# Patient Record
Sex: FEMALE | Race: WHITE | Employment: UNEMPLOYED | URBAN - METROPOLITAN AREA
[De-identification: names, ages, dates, MRNs, and addresses within clinical notes are randomized per-mention and may not be internally consistent; named-entity substitution may affect disease eponyms.]

---

## 2017-01-30 ENCOUNTER — APPOINTMENT (OUTPATIENT)
Dept: LAB | Age: 52
End: 2017-01-30
Attending: INTERNAL MEDICINE
Payer: COMMERCIAL

## 2017-01-30 DIAGNOSIS — C73 FOLLICULAR THYROID CANCER (HCC): ICD-10-CM

## 2017-01-30 DIAGNOSIS — E89.0 POSTPROCEDURAL HYPOTHYROIDISM: ICD-10-CM

## 2017-01-30 DIAGNOSIS — Z00.00 ROUTINE GENERAL MEDICAL EXAMINATION AT A HEALTH CARE FACILITY: ICD-10-CM

## 2017-01-30 DIAGNOSIS — Z79.899 LONG-TERM USE OF HIGH-RISK MEDICATION: ICD-10-CM

## 2017-01-30 LAB
BUN BLD-MCNC: 17 MG/DL (ref 8–20)
CALCIUM BLD-MCNC: 8.8 MG/DL (ref 8.3–10.3)
CHLORIDE: 105 MMOL/L (ref 101–111)
CO2: 27 MMOL/L (ref 22–32)
CREAT BLD-MCNC: 0.87 MG/DL (ref 0.55–1.02)
ERYTHROCYTE [DISTWIDTH] IN BLOOD BY AUTOMATED COUNT: 11.7 % (ref 11.5–16)
FREE T4: 1.5 NG/DL (ref 0.9–1.8)
GLUCOSE BLD-MCNC: 110 MG/DL (ref 70–99)
HCT VFR BLD AUTO: 35.7 % (ref 34–50)
HGB BLD-MCNC: 12.2 G/DL (ref 12–16)
MCH RBC QN AUTO: 31.4 PG (ref 27–33.2)
MCHC RBC AUTO-ENTMCNC: 34.2 G/DL (ref 31–37)
MCV RBC AUTO: 92 FL (ref 81–100)
PLATELET # BLD AUTO: 313 10(3)UL (ref 150–450)
POTASSIUM SERPL-SCNC: 4.4 MMOL/L (ref 3.6–5.1)
RBC # BLD AUTO: 3.88 X10(6)UL (ref 3.8–5.1)
RED CELL DISTRIBUTION WIDTH-SD: 39.4 FL (ref 35.1–46.3)
SODIUM SERPL-SCNC: 139 MMOL/L (ref 136–144)
TSI SER-ACNC: 0.08 MIU/ML (ref 0.35–5.5)
WBC # BLD AUTO: 5.3 X10(3) UL (ref 4–13)

## 2017-01-30 PROCEDURE — 80048 BASIC METABOLIC PNL TOTAL CA: CPT

## 2017-01-30 PROCEDURE — 80053 COMPREHEN METABOLIC PANEL: CPT

## 2017-01-30 PROCEDURE — 36415 COLL VENOUS BLD VENIPUNCTURE: CPT

## 2017-01-30 PROCEDURE — 84443 ASSAY THYROID STIM HORMONE: CPT

## 2017-01-30 PROCEDURE — 85027 COMPLETE CBC AUTOMATED: CPT

## 2017-01-30 PROCEDURE — 82306 VITAMIN D 25 HYDROXY: CPT

## 2017-01-30 PROCEDURE — 84439 ASSAY OF FREE THYROXINE: CPT

## 2017-01-30 PROCEDURE — 83036 HEMOGLOBIN GLYCOSYLATED A1C: CPT

## 2017-01-30 NOTE — PROGRESS NOTES
Quick Note:    Patient informed of Dr. Alphonse Hennessy result note. Patient verbalized understanding and agrees with plan.      rx sent.    ______

## 2017-01-30 NOTE — PROGRESS NOTES
Quick Note:    Dr. Aponte Son sent this result note to the patient as a [employer] message.   ______

## 2017-01-30 NOTE — PROGRESS NOTES
Quick Note:    [phone] (home)  [facility] regarding Dr. Aj Ponce result note.  Hours and number given.     ______

## 2017-01-31 LAB
EST. AVERAGE GLUCOSE BLD GHB EST-MCNC: 123 MG/DL (ref 68–126)
HBA1C MFR BLD HPLC: 5.9 % (ref ?–5.7)

## 2017-02-01 ENCOUNTER — NURSE ONLY (OUTPATIENT)
Dept: INTERNAL MEDICINE CLINIC | Facility: CLINIC | Age: 52
End: 2017-02-01

## 2017-02-01 DIAGNOSIS — E89.0 POSTPROCEDURAL HYPOTHYROIDISM: ICD-10-CM

## 2017-02-01 DIAGNOSIS — Z00.00 ROUTINE GENERAL MEDICAL EXAMINATION AT A HEALTH CARE FACILITY: Primary | ICD-10-CM

## 2017-02-01 LAB
25-HYDROXYVITAMIN D (TOTAL): 21 NG/ML (ref 30–100)
ALBUMIN SERPL-MCNC: 3.8 G/DL (ref 3.5–4.8)
ALP LIVER SERPL-CCNC: 70 U/L (ref 41–108)
ALT SERPL-CCNC: 23 U/L (ref 14–54)
AST SERPL-CCNC: 20 U/L (ref 15–41)
BILIRUB SERPL-MCNC: 0.3 MG/DL (ref 0.1–2)
BUN BLD-MCNC: 16 MG/DL (ref 8–20)
CALCIUM BLD-MCNC: 8.8 MG/DL (ref 8.3–10.3)
CHLORIDE: 107 MMOL/L (ref 101–111)
CO2: 24 MMOL/L (ref 22–32)
CREAT BLD-MCNC: 0.84 MG/DL (ref 0.55–1.02)
GLUCOSE BLD-MCNC: 104 MG/DL (ref 70–99)
M PROTEIN MFR SERPL ELPH: 7.5 G/DL (ref 6.1–8.3)
POTASSIUM SERPL-SCNC: 4.6 MMOL/L (ref 3.6–5.1)
SODIUM SERPL-SCNC: 140 MMOL/L (ref 136–144)

## 2017-02-01 PROCEDURE — 93000 ELECTROCARDIOGRAM COMPLETE: CPT | Performed by: INTERNAL MEDICINE

## 2017-02-01 PROCEDURE — 99173 VISUAL ACUITY SCREEN: CPT | Performed by: INTERNAL MEDICINE

## 2017-02-01 PROCEDURE — 92551 PURE TONE HEARING TEST AIR: CPT | Performed by: INTERNAL MEDICINE

## 2017-02-01 PROCEDURE — 83704 LIPOPROTEIN BLD QUAN PART: CPT | Performed by: INTERNAL MEDICINE

## 2017-02-03 LAB
HDL CHOLESTEROL: 60 MG/DL
HDL PARTICLE NUMBER: 38.4 UMOL/L
HDL SIZE: 9.4 NM
LARGE HDL PARTICLE NUMBER: 5.8 UMOL/L
LARGE VLDL PARTICLE NUMBER: 3.3 NMOL/L
LDL CHOLESTEROL: 160 MG/DL
LDL PARTICLE NUMBER BY NMR: 1505 NMOL/L
LDL PARTICLE SIZE: 22.3 NM
LDL SIZE: 22.3 NM
LP INSULIN RESISTANCE SCORE: 43
SMALL LDL PARTICLE NUMBER: 155 NMOL/L
SMALL LDL-P: 155 NMOL/L
TOTAL CHOLESTEROL: 235 MG/DL
TRIGLYCERIDES: 74 MG/DL
VLDL SIZE: 52.8 NM

## 2017-02-09 ENCOUNTER — TELEPHONE (OUTPATIENT)
Dept: INTERNAL MEDICINE CLINIC | Facility: CLINIC | Age: 52
End: 2017-02-09

## 2017-02-09 ENCOUNTER — OFFICE VISIT (OUTPATIENT)
Dept: INTERNAL MEDICINE CLINIC | Facility: CLINIC | Age: 52
End: 2017-02-09

## 2017-02-09 VITALS
DIASTOLIC BLOOD PRESSURE: 72 MMHG | HEIGHT: 68 IN | RESPIRATION RATE: 16 BRPM | BODY MASS INDEX: 30.77 KG/M2 | SYSTOLIC BLOOD PRESSURE: 110 MMHG | HEART RATE: 76 BPM | TEMPERATURE: 98 F | WEIGHT: 203 LBS

## 2017-02-09 DIAGNOSIS — Z00.01 ENCOUNTER FOR GENERAL ADULT MEDICAL EXAMINATION WITH ABNORMAL FINDINGS: Primary | ICD-10-CM

## 2017-02-09 DIAGNOSIS — R06.02 SHORTNESS OF BREATH: Primary | ICD-10-CM

## 2017-02-09 DIAGNOSIS — E78.00 HYPERCHOLESTEREMIA: ICD-10-CM

## 2017-02-09 DIAGNOSIS — E89.0 POSTPROCEDURAL HYPOTHYROIDISM: ICD-10-CM

## 2017-02-09 DIAGNOSIS — E66.3 OVERWEIGHT: ICD-10-CM

## 2017-02-09 DIAGNOSIS — R94.31 ABNORMAL EKG: ICD-10-CM

## 2017-02-09 DIAGNOSIS — Z83.79 FAMILY HISTORY OF GALLSTONES: ICD-10-CM

## 2017-02-09 DIAGNOSIS — Z85.850 HISTORY OF THYROID CANCER: ICD-10-CM

## 2017-02-09 DIAGNOSIS — Z12.31 ENCOUNTER FOR MAMMOGRAM TO ESTABLISH BASELINE MAMMOGRAM: ICD-10-CM

## 2017-02-09 DIAGNOSIS — R10.10 UPPER ABDOMINAL PAIN: ICD-10-CM

## 2017-02-09 PROCEDURE — 99396 PREV VISIT EST AGE 40-64: CPT | Performed by: INTERNAL MEDICINE

## 2017-02-09 RX ORDER — LORATADINE 10 MG/1
10 TABLET ORAL DAILY PRN
COMMUNITY

## 2017-02-09 RX ORDER — TRAMADOL HYDROCHLORIDE 50 MG/1
TABLET ORAL
Qty: 60 TABLET | Refills: 3 | Status: SHIPPED | OUTPATIENT
Start: 2017-02-09 | End: 2017-06-23

## 2017-02-09 RX ORDER — MULTIVIT-MIN/IRON/FOLIC ACID/K 18-600-40
2000 CAPSULE ORAL
COMMUNITY
End: 2017-04-14

## 2017-02-09 NOTE — PROGRESS NOTES
Isamar name: Balta Brownlee  /Sex/Age:  46year old female  Enc.  Date: 2017     Visit Information:  CC: Balta Brownlee is here for the [facility] Wellness Exam.  We are happy to be caring for you, Osmany Vernon,  and are ready to support your eating smaller portions, more vegetables. You are unable to get much exercise. We discussed and gave you hand-outs on a 1200-calorie diet, discussed Weight Watchers or Scottie Pineda.  In addition, JORGE LUIS has one month of recipes and menus for a 1200-calorie d between 150 mcg and 175 mcg. Disp: 90 tablet Rfl: 3   [DISCONTINUED] Levothyroxine Sodium 150 MCG Oral Tab Take 1 tablet (150 mcg total) by mouth before breakfast. Take 1 tablet by mouth every other day. Alternating between 150 mcg and 175 mcg.  Disp: 90 ta Date(s) Administered    >=3 YRS FLUZONE OR FLUARIX QUAD PRESERVE FREE SINGLE DOSE (38905) FLU CLINIC                          01/14/2016  10/11/2016      Flu Vacc 4 SRUTHI Split Virus 3 YR+                          10/22/2014    5.  Dental: every 6 months  6 Interpretation: 0 - 3 No Risk     PHQ-4: Over the LAST 2 WEEKS               Little interest or pleasure in doing things (over the last two weeks)?: Not at all    Feeling down, depressed, or hopeless (over the last two weeks)?: Not at all           Advance swelling. Joints without redness, swelling or tenderness. FROM. Decreased ROM of left hip. Skin: Clear, no rashes, lesions. .   Neuro: Alert, oriented, cranial nerves intact. No motor weakness, sensory loss.  Gait normal.  Reflexes normal.   Psych: No an High     75th    50th    25th   Low                         >34.9    34.9    30.5    26.7   <26.7                                                             .     Small LDL-P          Low      25th    50th    75th   High                         <117 MD  Performed at: [facility], [address], [address] [place] [address]   HDL Cholesterol Date: 02/01/2017   Value: 60  Ref Range >39 mg/dL Status: Final    Comment: [address], [address] [place], [place] [address] Percentile in Reference Population    Large VLDL-P      Low     25th     50th     75th     High                      <0.9    0.9      2.7      6.9      >6.9                                                             .     Small LDL-P       Low     25th [place]   [address]   Small LDL-P Date: 02/01/2017   Value: 155  Ref Range <=527 nmol/L Status: Final    Comment: [address]  [phone]  Idania Merchant MD  Performed at: [facility], [address] [place] [address] been shown to protect your heart and lungs, improve GI function and keep your joints limber. In addition, exercise along with maintaining sociability are the only habits shown to decrease your risk of Alzheimer's.     Access the newly-improved [facility]

## 2017-02-09 NOTE — TELEPHONE ENCOUNTER
Jean Claude Babcock is calling in requesting an order for her abdominal US. Jean Claude Babcock already scheduled her mammogram but now needs the order for her US to r/o gall stones.     Jean Claude Babcock will call Central Scheduling tomorrow to schedule her US unless the office calls back adin

## 2017-02-11 PROBLEM — Z85.850 HISTORY OF THYROID CANCER: Status: ACTIVE | Noted: 2017-02-11

## 2017-02-14 ENCOUNTER — APPOINTMENT (OUTPATIENT)
Dept: MAMMOGRAPHY | Facility: HOSPITAL | Age: 52
End: 2017-02-14
Attending: INTERNAL MEDICINE
Payer: COMMERCIAL

## 2017-02-14 ENCOUNTER — HOSPITAL ENCOUNTER (OUTPATIENT)
Dept: CV DIAGNOSTICS | Facility: HOSPITAL | Age: 52
Discharge: HOME | End: 2017-02-14
Attending: INTERNAL MEDICINE
Payer: COMMERCIAL

## 2017-02-14 DIAGNOSIS — E78.00 HYPERCHOLESTEREMIA: ICD-10-CM

## 2017-02-14 DIAGNOSIS — R94.31 ABNORMAL EKG: ICD-10-CM

## 2017-02-14 PROCEDURE — 78452 HT MUSCLE IMAGE SPECT MULT: CPT | Performed by: INTERNAL MEDICINE

## 2017-02-14 PROCEDURE — 93018 CV STRESS TEST I&R ONLY: CPT | Performed by: INTERNAL MEDICINE

## 2017-02-14 PROCEDURE — 78452 HT MUSCLE IMAGE SPECT MULT: CPT

## 2017-02-14 PROCEDURE — 93017 CV STRESS TEST TRACING ONLY: CPT

## 2017-02-16 ENCOUNTER — HOSPITAL ENCOUNTER (OUTPATIENT)
Dept: MAMMOGRAPHY | Facility: HOSPITAL | Age: 52
Discharge: HOME | End: 2017-02-16
Attending: INTERNAL MEDICINE
Payer: COMMERCIAL

## 2017-02-16 DIAGNOSIS — Z12.31 ENCOUNTER FOR MAMMOGRAM TO ESTABLISH BASELINE MAMMOGRAM: ICD-10-CM

## 2017-02-16 PROCEDURE — 77067 SCR MAMMO BI INCL CAD: CPT

## 2017-02-17 ENCOUNTER — HOSPITAL ENCOUNTER (OUTPATIENT)
Dept: ULTRASOUND IMAGING | Facility: HOSPITAL | Age: 52
Discharge: HOME | End: 2017-02-17
Attending: INTERNAL MEDICINE
Payer: COMMERCIAL

## 2017-02-17 DIAGNOSIS — R10.10 UPPER ABDOMINAL PAIN: ICD-10-CM

## 2017-02-17 DIAGNOSIS — Z83.79 FAMILY HISTORY OF GALLSTONES: ICD-10-CM

## 2017-02-17 PROCEDURE — 76700 US EXAM ABDOM COMPLETE: CPT

## 2017-02-24 ENCOUNTER — HOSPITAL ENCOUNTER (OUTPATIENT)
Dept: MAMMOGRAPHY | Facility: HOSPITAL | Age: 52
Discharge: HOME | End: 2017-02-24
Attending: INTERNAL MEDICINE
Payer: COMMERCIAL

## 2017-02-24 DIAGNOSIS — R92.2 INCONCLUSIVE MAMMOGRAM: ICD-10-CM

## 2017-02-24 PROCEDURE — 76642 ULTRASOUND BREAST LIMITED: CPT

## 2017-02-24 PROCEDURE — 77065 DX MAMMO INCL CAD UNI: CPT

## 2017-02-24 PROCEDURE — 77061 BREAST TOMOSYNTHESIS UNI: CPT

## 2017-03-10 ENCOUNTER — OFFICE VISIT (OUTPATIENT)
Dept: INTERNAL MEDICINE CLINIC | Facility: CLINIC | Age: 52
End: 2017-03-10

## 2017-03-10 VITALS
SYSTOLIC BLOOD PRESSURE: 118 MMHG | DIASTOLIC BLOOD PRESSURE: 70 MMHG | RESPIRATION RATE: 18 BRPM | OXYGEN SATURATION: 97 % | WEIGHT: 198.81 LBS | BODY MASS INDEX: 30 KG/M2 | TEMPERATURE: 98 F | HEART RATE: 72 BPM

## 2017-03-10 DIAGNOSIS — Z85.850 HISTORY OF THYROID CANCER: ICD-10-CM

## 2017-03-10 DIAGNOSIS — E66.3 OVERWEIGHT: ICD-10-CM

## 2017-03-10 DIAGNOSIS — M16.0 PRIMARY OSTEOARTHRITIS OF BOTH HIPS: ICD-10-CM

## 2017-03-10 DIAGNOSIS — Z51.89 ENCOUNTER FOR MEDICATION ADJUSTMENT: Primary | ICD-10-CM

## 2017-03-10 DIAGNOSIS — E55.9 VITAMIN D DEFICIENCY: ICD-10-CM

## 2017-03-10 DIAGNOSIS — R73.03 PREDIABETES: ICD-10-CM

## 2017-03-10 PROCEDURE — 99214 OFFICE O/P EST MOD 30 MIN: CPT | Performed by: INTERNAL MEDICINE

## 2017-03-10 NOTE — PATIENT INSTRUCTIONS
Look up [employer] to help you with your dieting    (M16.0) Primary osteoarthritis of both hips, uncontrolled  Plan:  Trylenol for moderate pain            Diclofenac for severe pain prn            F/U Dr. Alexandre Castillo prn    (E66.3) Overweight, uncontroll

## 2017-03-10 NOTE — PROGRESS NOTES
Patient presents with:  Medication Follow-Up: not taking tramadol made her tired       HPI:     Hip pain:  Still but Trama dol too sedating. Took Diclofenac due to severe pain. Taking Tylenol for arthritis to take edge off regularly and Diclof prn. [Other] [OTHER]       Mtr, sister, niece         Physical Exam: (2)  /70 mmHg  Pulse 72  Temp(Src) 97.8 °F (36.6 °C) (Oral)  Resp 18  Wt 198 lb 12.8 oz  SpO2 97%  LMP 05/29/2016 (Approximate)  Breastfeeding?  No  Alert, in no distress  Lungs: Clear, n

## 2017-03-11 NOTE — PROGRESS NOTES
Patient presents with:  Medication Follow-Up: not taking tramadol made her tired       HPI: Winifredkhushbu Marin is here for follow-up of hip pain, weight loss, review of mammogram and colonoscopy. Hip pain:  Still has but Tramadol was too sedating.   Prabhakar Barrett between 150 mcg and 175 mcg. Disp: 90 tablet Rfl: 3   Diclofenac Sodium 75 MG Oral Tab EC Take 1 tablet (75 mg total) by mouth 2 (two) times daily.  Disp: 180 tablet Rfl: 0   TraMADol HCl 50 MG Oral Tab One to two tabs po four times a day as needed for pain Diclofenac for severe pain prn            F/U Dr. Rafael Nelson prn    (E66.3) Overweight, uncontrolled but improving  Plan: Continue dieting about 1200 robbin per day and exercise           F/U 1 month    (R73.03) Prediabetes, uncontrolled  Plan: Continue weight

## 2017-03-28 ENCOUNTER — TELEPHONE (OUTPATIENT)
Dept: INTERNAL MEDICINE CLINIC | Facility: CLINIC | Age: 52
End: 2017-03-28

## 2017-04-12 ENCOUNTER — APPOINTMENT (OUTPATIENT)
Dept: LAB | Age: 52
End: 2017-04-12
Attending: INTERNAL MEDICINE
Payer: COMMERCIAL

## 2017-04-12 DIAGNOSIS — E55.9 VITAMIN D DEFICIENCY: ICD-10-CM

## 2017-04-12 DIAGNOSIS — R73.03 PREDIABETES: ICD-10-CM

## 2017-04-12 DIAGNOSIS — Z85.850 HISTORY OF THYROID CANCER: ICD-10-CM

## 2017-04-12 DIAGNOSIS — E78.00 HYPERCHOLESTEREMIA: ICD-10-CM

## 2017-04-12 PROCEDURE — 83036 HEMOGLOBIN GLYCOSYLATED A1C: CPT | Performed by: INTERNAL MEDICINE

## 2017-04-12 PROCEDURE — 36415 COLL VENOUS BLD VENIPUNCTURE: CPT | Performed by: INTERNAL MEDICINE

## 2017-04-12 PROCEDURE — 80061 LIPID PANEL: CPT | Performed by: INTERNAL MEDICINE

## 2017-04-12 PROCEDURE — 84443 ASSAY THYROID STIM HORMONE: CPT | Performed by: INTERNAL MEDICINE

## 2017-04-12 PROCEDURE — 84439 ASSAY OF FREE THYROXINE: CPT | Performed by: INTERNAL MEDICINE

## 2017-04-12 PROCEDURE — 82306 VITAMIN D 25 HYDROXY: CPT | Performed by: INTERNAL MEDICINE

## 2017-04-14 ENCOUNTER — OFFICE VISIT (OUTPATIENT)
Dept: INTERNAL MEDICINE CLINIC | Facility: CLINIC | Age: 52
End: 2017-04-14

## 2017-04-14 VITALS
RESPIRATION RATE: 16 BRPM | HEIGHT: 68 IN | HEART RATE: 72 BPM | BODY MASS INDEX: 28.95 KG/M2 | WEIGHT: 191 LBS | DIASTOLIC BLOOD PRESSURE: 74 MMHG | SYSTOLIC BLOOD PRESSURE: 104 MMHG | TEMPERATURE: 98 F

## 2017-04-14 DIAGNOSIS — Z71.3 ENCOUNTER FOR WEIGHT LOSS COUNSELING: Primary | ICD-10-CM

## 2017-04-14 DIAGNOSIS — E78.00 HYPERCHOLESTEREMIA: ICD-10-CM

## 2017-04-14 DIAGNOSIS — E66.3 OVERWEIGHT (BMI 25.0-29.9): ICD-10-CM

## 2017-04-14 DIAGNOSIS — C73 FOLLICULAR THYROID CANCER (HCC): ICD-10-CM

## 2017-04-14 DIAGNOSIS — R73.03 PREDIABETES: ICD-10-CM

## 2017-04-14 DIAGNOSIS — E89.0 POSTPROCEDURAL HYPOTHYROIDISM: ICD-10-CM

## 2017-04-14 PROCEDURE — 99213 OFFICE O/P EST LOW 20 MIN: CPT | Performed by: INTERNAL MEDICINE

## 2017-04-14 RX ORDER — MULTIVIT-MIN/IRON/FOLIC ACID/K 18-600-40
50000 CAPSULE ORAL EVERY OTHER DAY
Qty: 30 CAPSULE | Refills: 0 | COMMUNITY
Start: 2017-04-14

## 2017-04-14 RX ORDER — LEVOTHYROXINE SODIUM 0.15 MG/1
150 TABLET ORAL
Qty: 90 TABLET | Refills: 3 | Status: SHIPPED | OUTPATIENT
Start: 2017-04-14 | End: 2017-07-28

## 2017-04-14 NOTE — PATIENT INSTRUCTIONS
Keep up the good work working toward your goal for overall good health, heart disease, diabetes and cancer prevention.

## 2017-04-14 NOTE — PROGRESS NOTES
Patient presents with:  Weight Check: exercise 4x a week, decreasing calories       HPI: Osmany Vernon is here for weight check. Lab review. Osmany Vernon has systematically been following a 1200-robbin diet and exercising 30 min a day for the last month.   She has l TraMADol HCl 50 MG Oral Tab One to two tabs po four times a day as needed for pain.  Disp: 60 tablet Rfl: 3       PFHSH:   Family History   Problem Relation Age of Onset   • Lung cancer [Other] [OTHER] Father 67     Asbestos exposure   • Uterine Cancer Mo

## 2017-05-20 ENCOUNTER — HOSPITAL ENCOUNTER (EMERGENCY)
Facility: HOSPITAL | Age: 52
Discharge: HOME | End: 2017-05-20
Attending: EMERGENCY MEDICINE
Payer: COMMERCIAL

## 2017-05-20 ENCOUNTER — APPOINTMENT (OUTPATIENT)
Dept: GENERAL RADIOLOGY | Facility: HOSPITAL | Age: 52
End: 2017-05-20
Payer: COMMERCIAL

## 2017-05-20 VITALS
OXYGEN SATURATION: 100 % | HEIGHT: 68 IN | DIASTOLIC BLOOD PRESSURE: 96 MMHG | TEMPERATURE: 98 F | HEART RATE: 96 BPM | RESPIRATION RATE: 18 BRPM | SYSTOLIC BLOOD PRESSURE: 157 MMHG | BODY MASS INDEX: 28.34 KG/M2 | WEIGHT: 187 LBS

## 2017-05-20 DIAGNOSIS — S46.912A SHOULDER STRAIN, LEFT, INITIAL ENCOUNTER: Primary | ICD-10-CM

## 2017-05-20 PROCEDURE — 99283 EMERGENCY DEPT VISIT LOW MDM: CPT

## 2017-05-20 PROCEDURE — 73030 X-RAY EXAM OF SHOULDER: CPT

## 2017-05-20 RX ORDER — HYDROCODONE BITARTRATE AND ACETAMINOPHEN 5; 325 MG/1; MG/1
1 TABLET ORAL ONCE
Status: COMPLETED | OUTPATIENT
Start: 2017-05-20 | End: 2017-05-20

## 2017-05-20 RX ORDER — HYDROCODONE BITARTRATE AND ACETAMINOPHEN 5; 325 MG/1; MG/1
1-2 TABLET ORAL EVERY 4 HOURS PRN
Qty: 20 TABLET | Refills: 0 | Status: SHIPPED | OUTPATIENT
Start: 2017-05-20 | End: 2017-05-27

## 2017-05-20 NOTE — ED PROVIDER NOTES
Patient Seen in: [facility] Emergency Department    History   Patient presents with:  Upper Extremity Injury (musculoskeletal)    Stated Complaint: left shoulder injury- worse after chiropractor today    HPI    Patient is a 49-year-old female who st exposure   • Uterine Cancer Mother 48     Alive at 80   • Breast Cancer Sister 52     Double mastectomy; alive at 61   • Diabetes Father 67     Overweight   • Gallbladder [Other] [OTHER]       Mtr, sister, niece   • Appendix [Other] [OTHER]       Mtr, sist shoulder x-rayCONCLUSION:  A.c. joint hypertrophy with slight lateral downsloping of the acromion. No acute fracture or dislocation.     MDM   Patient was given Norco and has a friend driving. Patient does see Raul Carranza.   Patient will

## 2017-05-20 NOTE — ED NOTES
Pt states on Thursday she was getting up from a seated position using her hands to brace on the table (patient has a history of osteoarthritis in the hips and uses upper body to stand after sitting a while) when she felt a crunching in the left shoulder.

## 2017-06-23 ENCOUNTER — OFFICE VISIT (OUTPATIENT)
Dept: INTERNAL MEDICINE CLINIC | Facility: CLINIC | Age: 52
End: 2017-06-23

## 2017-06-23 VITALS
BODY MASS INDEX: 27.74 KG/M2 | OXYGEN SATURATION: 98 % | SYSTOLIC BLOOD PRESSURE: 114 MMHG | WEIGHT: 183 LBS | HEIGHT: 68 IN | RESPIRATION RATE: 17 BRPM | TEMPERATURE: 98 F | DIASTOLIC BLOOD PRESSURE: 68 MMHG | HEART RATE: 71 BPM

## 2017-06-23 DIAGNOSIS — Z85.850 HISTORY OF THYROID CANCER: ICD-10-CM

## 2017-06-23 DIAGNOSIS — E89.0 POSTPROCEDURAL HYPOTHYROIDISM: ICD-10-CM

## 2017-06-23 DIAGNOSIS — Z51.81 ENCOUNTER FOR MONITORING CHRONIC NSAID THERAPY: ICD-10-CM

## 2017-06-23 DIAGNOSIS — Z79.1 ENCOUNTER FOR MONITORING CHRONIC NSAID THERAPY: ICD-10-CM

## 2017-06-23 DIAGNOSIS — R73.02 GLUCOSE INTOLERANCE (IMPAIRED GLUCOSE TOLERANCE): ICD-10-CM

## 2017-06-23 DIAGNOSIS — M16.0 PRIMARY OSTEOARTHRITIS OF BOTH HIPS: Primary | ICD-10-CM

## 2017-06-23 PROCEDURE — 99214 OFFICE O/P EST MOD 30 MIN: CPT | Performed by: INTERNAL MEDICINE

## 2017-06-23 NOTE — PROGRESS NOTES
Julia French is a 46year old female.   Patient presents with:  Arthritis: Hip   Pre-diabetes  Thyroid Problem      HPI:     Patient here for establishing care, was with Dr. La Parker b/l hip pain due to advanced OA, needs replacement and thinking ab • Diabetes Father 67     Overweight   • Gallbladder [Other] [OTHER]       Mtr, sister, niece   • Appendix [Other] Mei Oseguera       Mtr, sister, niece        Allergies    Penicillins             Hives  Seasonal                Runny nose, Itching      REVIEW f/u.  There are no Patient Instructions on file for this visit. The patient indicates understanding of these issues and agrees to the plan.

## 2017-07-28 ENCOUNTER — APPOINTMENT (OUTPATIENT)
Dept: LAB | Age: 52
End: 2017-07-28
Attending: INTERNAL MEDICINE
Payer: COMMERCIAL

## 2017-07-28 DIAGNOSIS — Z51.81 ENCOUNTER FOR MONITORING CHRONIC NSAID THERAPY: ICD-10-CM

## 2017-07-28 DIAGNOSIS — Z79.1 ENCOUNTER FOR MONITORING CHRONIC NSAID THERAPY: ICD-10-CM

## 2017-07-28 DIAGNOSIS — R73.02 GLUCOSE INTOLERANCE (IMPAIRED GLUCOSE TOLERANCE): ICD-10-CM

## 2017-07-28 DIAGNOSIS — E89.0 POSTPROCEDURAL HYPOTHYROIDISM: ICD-10-CM

## 2017-07-28 LAB
ALBUMIN SERPL-MCNC: 3.5 G/DL (ref 3.5–4.8)
ALP LIVER SERPL-CCNC: 71 U/L (ref 41–108)
ALT SERPL-CCNC: 21 U/L (ref 14–54)
AST SERPL-CCNC: 11 U/L (ref 15–41)
BILIRUB SERPL-MCNC: 0.5 MG/DL (ref 0.1–2)
BUN BLD-MCNC: 13 MG/DL (ref 8–20)
CALCIUM BLD-MCNC: 8.3 MG/DL (ref 8.3–10.3)
CHLORIDE: 107 MMOL/L (ref 101–111)
CO2: 26 MMOL/L (ref 22–32)
CREAT BLD-MCNC: 0.83 MG/DL (ref 0.55–1.02)
EST. AVERAGE GLUCOSE BLD GHB EST-MCNC: 105 MG/DL (ref 68–126)
GLUCOSE BLD-MCNC: 89 MG/DL (ref 70–99)
HBA1C MFR BLD HPLC: 5.3 % (ref ?–5.7)
M PROTEIN MFR SERPL ELPH: 7.5 G/DL (ref 6.1–8.3)
POTASSIUM SERPL-SCNC: 4.1 MMOL/L (ref 3.6–5.1)
SODIUM SERPL-SCNC: 141 MMOL/L (ref 136–144)

## 2017-07-28 PROCEDURE — 83036 HEMOGLOBIN GLYCOSYLATED A1C: CPT

## 2017-07-28 PROCEDURE — 80053 COMPREHEN METABOLIC PANEL: CPT

## 2017-07-28 PROCEDURE — 36415 COLL VENOUS BLD VENIPUNCTURE: CPT

## 2017-08-30 ENCOUNTER — LAB ENCOUNTER (OUTPATIENT)
Dept: LAB | Age: 52
End: 2017-08-30
Attending: INTERNAL MEDICINE
Payer: COMMERCIAL

## 2017-08-30 DIAGNOSIS — E89.0 POSTPROCEDURAL HYPOTHYROIDISM: ICD-10-CM

## 2017-08-30 DIAGNOSIS — C73 THYROID CANCER (HCC): ICD-10-CM

## 2017-08-30 LAB
FREE T4: 1.4 NG/DL (ref 0.9–1.8)
TSI SER-ACNC: 0.06 MIU/ML (ref 0.35–5.5)

## 2017-08-30 PROCEDURE — 84443 ASSAY THYROID STIM HORMONE: CPT

## 2017-08-30 PROCEDURE — 84439 ASSAY OF FREE THYROXINE: CPT

## 2017-08-30 PROCEDURE — 36415 COLL VENOUS BLD VENIPUNCTURE: CPT

## 2017-11-17 ENCOUNTER — APPOINTMENT (OUTPATIENT)
Dept: LAB | Age: 52
End: 2017-11-17
Attending: INTERNAL MEDICINE
Payer: COMMERCIAL

## 2017-11-17 DIAGNOSIS — C73 THYROID CANCER (HCC): ICD-10-CM

## 2017-11-17 DIAGNOSIS — E89.0 POSTPROCEDURAL HYPOTHYROIDISM: ICD-10-CM

## 2017-11-17 PROCEDURE — 84439 ASSAY OF FREE THYROXINE: CPT

## 2017-11-17 PROCEDURE — 36415 COLL VENOUS BLD VENIPUNCTURE: CPT

## 2017-11-17 PROCEDURE — 84443 ASSAY THYROID STIM HORMONE: CPT

## 2017-11-27 ENCOUNTER — IMMUNIZATION (OUTPATIENT)
Dept: INTERNAL MEDICINE CLINIC | Facility: CLINIC | Age: 52
End: 2017-11-27

## 2017-11-27 DIAGNOSIS — Z23 NEED FOR VACCINATION: ICD-10-CM

## 2017-11-27 PROCEDURE — 90471 IMMUNIZATION ADMIN: CPT | Performed by: INTERNAL MEDICINE

## 2017-11-27 PROCEDURE — 90686 IIV4 VACC NO PRSV 0.5 ML IM: CPT | Performed by: INTERNAL MEDICINE

## 2017-12-19 ENCOUNTER — HOSPITAL ENCOUNTER (OUTPATIENT)
Dept: ULTRASOUND IMAGING | Facility: HOSPITAL | Age: 52
Discharge: HOME | End: 2017-12-19
Attending: INTERNAL MEDICINE
Payer: COMMERCIAL

## 2017-12-19 DIAGNOSIS — C73 THYROID CANCER (HCC): ICD-10-CM

## 2017-12-19 DIAGNOSIS — E89.0 POSTPROCEDURAL HYPOTHYROIDISM: ICD-10-CM

## 2017-12-19 PROCEDURE — 76536 US EXAM OF HEAD AND NECK: CPT | Performed by: INTERNAL MEDICINE

## 2018-01-08 ENCOUNTER — TELEPHONE (OUTPATIENT)
Dept: INTERNAL MEDICINE CLINIC | Facility: CLINIC | Age: 53
End: 2018-01-08

## 2018-01-08 DIAGNOSIS — E78.00 HYPERCHOLESTEREMIA: ICD-10-CM

## 2018-01-08 DIAGNOSIS — Z13.0 SCREENING, ANEMIA, DEFICIENCY, IRON: ICD-10-CM

## 2018-01-08 DIAGNOSIS — Z12.31 ENCOUNTER FOR SCREENING MAMMOGRAM FOR BREAST CANCER: Primary | ICD-10-CM

## 2018-01-08 DIAGNOSIS — Z13.228 SCREENING FOR METABOLIC DISORDER: ICD-10-CM

## 2018-01-11 PROBLEM — R73.03 PREDIABETES: Status: RESOLVED | Noted: 2017-03-10 | Resolved: 2018-01-11

## 2018-01-12 ENCOUNTER — LAB ENCOUNTER (OUTPATIENT)
Dept: LAB | Age: 53
End: 2018-01-12
Attending: INTERNAL MEDICINE
Payer: COMMERCIAL

## 2018-01-12 DIAGNOSIS — C73 THYROID CA (HCC): ICD-10-CM

## 2018-01-12 PROCEDURE — 36415 COLL VENOUS BLD VENIPUNCTURE: CPT

## 2018-01-12 PROCEDURE — 84432 ASSAY OF THYROGLOBULIN: CPT

## 2018-01-12 PROCEDURE — 86800 THYROGLOBULIN ANTIBODY: CPT

## 2018-01-14 LAB
THYROGLOBULIN AB: <0.9 IU/ML
THYROGLOBULIN, SERUM OR PLASMA: <0.1 NG/ML

## 2018-01-19 ENCOUNTER — PATIENT MESSAGE (OUTPATIENT)
Dept: INTERNAL MEDICINE CLINIC | Facility: CLINIC | Age: 53
End: 2018-01-19

## 2018-01-19 NOTE — TELEPHONE ENCOUNTER
From: Jeff Burkett  To: Donna Sandy MD  Sent: 1/19/2018 11:21 AM CST  Subject: Prescription Question    I need a refill on my prescription for diclofenac sodium.  Dr. Margie Hoyt refilled my last presecription because I was in between PCPs as Dr. Oli Mills

## 2018-01-22 RX ORDER — DICLOFENAC SODIUM 75 MG/1
75 TABLET, DELAYED RELEASE ORAL 2 TIMES DAILY
Qty: 60 TABLET | Refills: 0 | Status: SHIPPED | OUTPATIENT
Start: 2018-01-22 | End: 2018-02-26

## 2018-01-22 NOTE — TELEPHONE ENCOUNTER
LFV: 6/23/17 with TB  Future Appt: 2/14/18  Last Rx: 5/25/17 for 60 tablets w/2 refills from Dr. Lisa Riley  Last Labs:7/28/17 cmp stable  Per protocol to provider

## 2018-02-27 RX ORDER — DICLOFENAC SODIUM 75 MG/1
75 TABLET, DELAYED RELEASE ORAL 2 TIMES DAILY
Qty: 60 TABLET | Refills: 1 | Status: SHIPPED | OUTPATIENT
Start: 2018-02-27 | End: 2018-06-02

## 2018-02-27 NOTE — TELEPHONE ENCOUNTER
Last Office Visit: 6-23-17 with TB for arthritis  Last Rx Filled: 1-22-18 60 tabs with no refills   Last Labs: 7-28-17 hga1c/cmp  Future Appointment: none     Per protocol to provider

## 2018-04-26 ENCOUNTER — LAB ENCOUNTER (OUTPATIENT)
Dept: LAB | Age: 53
End: 2018-04-26
Attending: INTERNAL MEDICINE
Payer: COMMERCIAL

## 2018-04-26 DIAGNOSIS — Z13.228 SCREENING FOR METABOLIC DISORDER: ICD-10-CM

## 2018-04-26 DIAGNOSIS — E78.00 HYPERCHOLESTEREMIA: ICD-10-CM

## 2018-04-26 DIAGNOSIS — Z13.0 SCREENING, ANEMIA, DEFICIENCY, IRON: ICD-10-CM

## 2018-04-26 PROCEDURE — 80053 COMPREHEN METABOLIC PANEL: CPT | Performed by: INTERNAL MEDICINE

## 2018-04-26 PROCEDURE — 36415 COLL VENOUS BLD VENIPUNCTURE: CPT | Performed by: INTERNAL MEDICINE

## 2018-04-26 PROCEDURE — 85025 COMPLETE CBC W/AUTO DIFF WBC: CPT | Performed by: INTERNAL MEDICINE

## 2018-04-26 PROCEDURE — 80061 LIPID PANEL: CPT | Performed by: INTERNAL MEDICINE

## 2018-04-27 ENCOUNTER — HOSPITAL ENCOUNTER (OUTPATIENT)
Dept: MAMMOGRAPHY | Facility: HOSPITAL | Age: 53
Discharge: HOME | End: 2018-04-27
Attending: INTERNAL MEDICINE
Payer: COMMERCIAL

## 2018-04-27 DIAGNOSIS — Z12.31 ENCOUNTER FOR SCREENING MAMMOGRAM FOR BREAST CANCER: ICD-10-CM

## 2018-04-27 PROCEDURE — 77063 BREAST TOMOSYNTHESIS BI: CPT | Performed by: INTERNAL MEDICINE

## 2018-04-27 PROCEDURE — 77067 SCR MAMMO BI INCL CAD: CPT | Performed by: INTERNAL MEDICINE

## 2018-05-01 ENCOUNTER — OFFICE VISIT (OUTPATIENT)
Dept: INTERNAL MEDICINE CLINIC | Facility: CLINIC | Age: 53
End: 2018-05-01

## 2018-05-01 VITALS
TEMPERATURE: 98 F | DIASTOLIC BLOOD PRESSURE: 78 MMHG | WEIGHT: 173 LBS | HEIGHT: 68 IN | HEART RATE: 76 BPM | RESPIRATION RATE: 16 BRPM | SYSTOLIC BLOOD PRESSURE: 120 MMHG | BODY MASS INDEX: 26.22 KG/M2

## 2018-05-01 DIAGNOSIS — Z23 NEED FOR TDAP VACCINATION: ICD-10-CM

## 2018-05-01 DIAGNOSIS — Z00.00 ROUTINE GENERAL MEDICAL EXAMINATION AT A HEALTH CARE FACILITY: Primary | ICD-10-CM

## 2018-05-01 DIAGNOSIS — R92.2 DENSE BREASTS: ICD-10-CM

## 2018-05-01 DIAGNOSIS — J32.8 OTHER CHRONIC SINUSITIS: ICD-10-CM

## 2018-05-01 DIAGNOSIS — Z12.31 ENCOUNTER FOR SCREENING MAMMOGRAM FOR MALIGNANT NEOPLASM OF BREAST: ICD-10-CM

## 2018-05-01 PROCEDURE — 99396 PREV VISIT EST AGE 40-64: CPT | Performed by: INTERNAL MEDICINE

## 2018-05-01 PROCEDURE — 90471 IMMUNIZATION ADMIN: CPT | Performed by: INTERNAL MEDICINE

## 2018-05-01 PROCEDURE — 90715 TDAP VACCINE 7 YRS/> IM: CPT | Performed by: INTERNAL MEDICINE

## 2018-05-01 NOTE — PROGRESS NOTES
Patient presents with:  Physical: LB-room 3      HPI:    Patient here for cpe   utd on pap and mammogram.   C/o b/l hip pain due to advanced OA,  Takes diclofenac prn,seeing ortho  Hypothyroidism post surgery for thyroid cancer, follows with Dr. Fco Preston, feel exposure   • Uterine Cancer Mother 48     Alive at 80   • Breast Cancer Sister 52     Double mastectomy; alive at 61   • Gallbladder [OTHER] Other      Mtr, sister, niece   • Appendix Dorsie Angelinat Other      Mtr, sister, niece     Smoking status: Never Smoker and tympanic membranes normal.  Nose normal. Oropharynx is clear and moist.   Eyes: Conjunctivae and EOM are normal. PERRLA. No scleral icterus. Neck: Normal range of motion. Neck supple. Normal carotid pulses and no JVD present. No edema present.  No mas

## 2018-06-04 RX ORDER — DICLOFENAC SODIUM 75 MG/1
TABLET, DELAYED RELEASE ORAL
Qty: 60 TABLET | Refills: 0 | Status: SHIPPED | OUTPATIENT
Start: 2018-06-04 | End: 2018-07-04

## 2018-06-04 NOTE — TELEPHONE ENCOUNTER
LOV:5/1/2018   Last Lab:4/26/2018 cmp, cbc, lipid   Last Refill: 2/27/2018, 60 tabs 1 refill     Per protocol route to provider

## 2018-07-05 RX ORDER — DICLOFENAC SODIUM 75 MG/1
TABLET, DELAYED RELEASE ORAL
Qty: 60 TABLET | Refills: 0 | Status: SHIPPED | OUTPATIENT
Start: 2018-07-05 | End: 2019-01-05

## 2018-07-05 NOTE — TELEPHONE ENCOUNTER
LOV: 05/01/2018   Future Visit: No appointment scheduled   Last Labs: 04/26/2018 COMP, CBC, Lipid panel   Last Rx: 06/04/2018    Per protocol sent to provider

## 2018-11-19 ENCOUNTER — APPOINTMENT (OUTPATIENT)
Dept: LAB | Age: 53
End: 2018-11-19
Attending: INTERNAL MEDICINE
Payer: COMMERCIAL

## 2018-11-19 DIAGNOSIS — E89.0 POSTPROCEDURAL HYPOTHYROIDISM: ICD-10-CM

## 2018-11-19 PROCEDURE — 84443 ASSAY THYROID STIM HORMONE: CPT

## 2018-11-19 PROCEDURE — 36415 COLL VENOUS BLD VENIPUNCTURE: CPT

## 2018-11-19 PROCEDURE — 84439 ASSAY OF FREE THYROXINE: CPT

## 2019-01-07 ENCOUNTER — TELEPHONE (OUTPATIENT)
Dept: INTERNAL MEDICINE CLINIC | Facility: CLINIC | Age: 54
End: 2019-01-07

## 2019-01-07 DIAGNOSIS — Z00.00 ROUTINE GENERAL MEDICAL EXAMINATION AT A HEALTH CARE FACILITY: Primary | ICD-10-CM

## 2019-01-07 RX ORDER — DICLOFENAC SODIUM 75 MG/1
75 TABLET, DELAYED RELEASE ORAL 2 TIMES DAILY
Qty: 60 TABLET | Refills: 0 | Status: SHIPPED | OUTPATIENT
Start: 2019-01-07 | End: 2019-03-08

## 2019-01-07 NOTE — TELEPHONE ENCOUNTER
Last Office Visit: 5-1-18 with TB for cpe  Last Rx Filled: 7-5-18 60 tabs with no refills   Last Labs: 11-19-18 tsh/t4. 4-26-18 cmp/cbc/lipid  Future Appointment: 5-3-19    Per protocol to provider

## 2019-01-07 NOTE — TELEPHONE ENCOUNTER
CPE   Future Appointments   Date Time Provider [address]   5/3/2019 11:15 AM Lana Chadwick MD [facility] IM     Orders to Bruce Lentz aware must fast no call back required

## 2019-01-10 ENCOUNTER — NURSE ONLY (OUTPATIENT)
Dept: INTERNAL MEDICINE CLINIC | Facility: CLINIC | Age: 54
End: 2019-01-10
Payer: COMMERCIAL

## 2019-01-10 DIAGNOSIS — Z23 NEED FOR INFLUENZA VACCINATION: Primary | ICD-10-CM

## 2019-01-10 PROCEDURE — 90471 IMMUNIZATION ADMIN: CPT | Performed by: INTERNAL MEDICINE

## 2019-01-10 PROCEDURE — 90686 IIV4 VACC NO PRSV 0.5 ML IM: CPT | Performed by: INTERNAL MEDICINE

## 2019-03-08 RX ORDER — DICLOFENAC SODIUM 75 MG/1
75 TABLET, DELAYED RELEASE ORAL 2 TIMES DAILY
Qty: 60 TABLET | Refills: 0 | Status: SHIPPED | OUTPATIENT
Start: 2019-03-08 | End: 2019-05-24

## 2019-03-08 NOTE — TELEPHONE ENCOUNTER
Last Office Visit: 5-1-18 with TB for cpe   Last Rx Filled: 1-7-19 60 tabs with no refills   Last Labs: 11-19-18 tsh/t4. 4-26-18 cbc/cmp/lipid  Future Appointment: 5-3-19    Per protocol to provider

## 2019-05-21 ENCOUNTER — LAB ENCOUNTER (OUTPATIENT)
Dept: LAB | Age: 54
End: 2019-05-21
Attending: INTERNAL MEDICINE
Payer: COMMERCIAL

## 2019-05-21 DIAGNOSIS — Z00.00 ROUTINE GENERAL MEDICAL EXAMINATION AT A HEALTH CARE FACILITY: ICD-10-CM

## 2019-05-21 PROCEDURE — 80050 GENERAL HEALTH PANEL: CPT | Performed by: INTERNAL MEDICINE

## 2019-05-21 PROCEDURE — 84439 ASSAY OF FREE THYROXINE: CPT | Performed by: INTERNAL MEDICINE

## 2019-05-21 PROCEDURE — 80061 LIPID PANEL: CPT | Performed by: INTERNAL MEDICINE

## 2019-05-21 PROCEDURE — 36415 COLL VENOUS BLD VENIPUNCTURE: CPT | Performed by: INTERNAL MEDICINE

## 2019-05-21 PROCEDURE — 84481 FREE ASSAY (FT-3): CPT | Performed by: INTERNAL MEDICINE

## 2019-05-24 ENCOUNTER — OFFICE VISIT (OUTPATIENT)
Dept: INTERNAL MEDICINE CLINIC | Facility: CLINIC | Age: 54
End: 2019-05-24
Payer: COMMERCIAL

## 2019-05-24 VITALS
SYSTOLIC BLOOD PRESSURE: 124 MMHG | HEART RATE: 76 BPM | HEIGHT: 68 IN | RESPIRATION RATE: 16 BRPM | DIASTOLIC BLOOD PRESSURE: 78 MMHG | TEMPERATURE: 98 F | BODY MASS INDEX: 27.28 KG/M2 | WEIGHT: 180 LBS

## 2019-05-24 DIAGNOSIS — M16.0 PRIMARY OSTEOARTHRITIS OF BOTH HIPS: ICD-10-CM

## 2019-05-24 DIAGNOSIS — Z11.51 SCREENING FOR HPV (HUMAN PAPILLOMAVIRUS): ICD-10-CM

## 2019-05-24 DIAGNOSIS — Z12.4 SCREENING FOR MALIGNANT NEOPLASM OF CERVIX: ICD-10-CM

## 2019-05-24 DIAGNOSIS — Z00.00 ROUTINE GENERAL MEDICAL EXAMINATION AT A HEALTH CARE FACILITY: Primary | ICD-10-CM

## 2019-05-24 DIAGNOSIS — Z12.11 ENCOUNTER FOR SCREENING COLONOSCOPY: ICD-10-CM

## 2019-05-24 PROCEDURE — 99396 PREV VISIT EST AGE 40-64: CPT | Performed by: INTERNAL MEDICINE

## 2019-05-24 PROCEDURE — 87624 HPV HI-RISK TYP POOLED RSLT: CPT | Performed by: INTERNAL MEDICINE

## 2019-05-24 PROCEDURE — 88175 CYTOPATH C/V AUTO FLUID REDO: CPT | Performed by: INTERNAL MEDICINE

## 2019-05-24 RX ORDER — DICLOFENAC SODIUM 75 MG/1
75 TABLET, DELAYED RELEASE ORAL 2 TIMES DAILY
Qty: 60 TABLET | Refills: 11 | Status: SHIPPED | OUTPATIENT
Start: 2019-05-24 | End: 2021-01-29

## 2019-05-24 NOTE — PROGRESS NOTES
Patient presents with:  Physical: w/ pap smear. LB-rm 4      HPI:    Patient here for cpe with pap  C/o b/l hip pain for years due to OA,  Takes diclofenac prn, will soon look into surgery.  Thinking to change jobs so will have some time in the middle to ta teeth x4 removal   • THYROIDECTOMY      partial    • THYROIDECTOMY Right 3/10/2016    Performed by Adarsh Bauman MD at [facility] MAIN OR   • THYROIDECTOMY Left 2/18/2016    Performed by Adarsh Bauman MD at [facility] MAIN OR     Family History   Problem Relat Patient Position: Sitting, Cuff Size: adult)   Pulse 76   Temp 98.4 °F (36.9 °C) (Oral)   Resp 16   Ht 68\"   Wt 180 lb   LMP 05/29/2016 (Approximate)   Breastfeeding? No   BMI 27.37 kg/m²   Constitutional: Oriented to person, place, and time. No distress. Dna  High Risk , Thin Prep Collect      ThinPrep PAP Smear      Meds & Refills for this Visit:  Requested Prescriptions     Signed Prescriptions Disp Refills   • Diclofenac Sodium 75 MG Oral Tab EC 60 tablet 11     Sig: Take 1 tablet (75 mg total) by mouth

## 2019-08-22 PROBLEM — D12.3 BENIGN NEOPLASM OF TRANSVERSE COLON: Status: ACTIVE | Noted: 2019-08-22

## 2019-08-22 PROBLEM — Z86.010 PERSONAL HISTORY OF COLONIC POLYPS: Status: ACTIVE | Noted: 2019-08-22

## 2019-11-19 ENCOUNTER — LAB ENCOUNTER (OUTPATIENT)
Dept: LAB | Age: 54
End: 2019-11-19
Attending: INTERNAL MEDICINE
Payer: COMMERCIAL

## 2019-11-19 DIAGNOSIS — C73 FOLLICULAR THYROID CANCER (HCC): ICD-10-CM

## 2019-11-19 PROCEDURE — 84432 ASSAY OF THYROGLOBULIN: CPT | Performed by: INTERNAL MEDICINE

## 2019-11-19 PROCEDURE — 84481 FREE ASSAY (FT-3): CPT

## 2019-11-19 PROCEDURE — 84443 ASSAY THYROID STIM HORMONE: CPT

## 2019-11-19 PROCEDURE — 36415 COLL VENOUS BLD VENIPUNCTURE: CPT | Performed by: INTERNAL MEDICINE

## 2019-11-19 PROCEDURE — 36415 COLL VENOUS BLD VENIPUNCTURE: CPT

## 2019-11-19 PROCEDURE — 86800 THYROGLOBULIN ANTIBODY: CPT | Performed by: INTERNAL MEDICINE

## 2019-11-19 PROCEDURE — 84439 ASSAY OF FREE THYROXINE: CPT

## 2020-09-29 DIAGNOSIS — M16.0 PRIMARY OSTEOARTHRITIS OF BOTH HIPS: ICD-10-CM

## 2020-09-29 NOTE — TELEPHONE ENCOUNTER
Last Ov: 5/24/19, TB, CPE  Last labs:  Thyroglobulin, TSH w Ref, Free T4, Free T3 11/19/19  Last Rx: diclofenac 75mg, #60, 11R 5/24/19    Future Appointments   Date Time Provider Cris Ponce   11/19/2020  9:00 AM Radha Mckeon MD [address] [place] [address]

## 2020-09-30 RX ORDER — DICLOFENAC SODIUM 75 MG/1
TABLET, DELAYED RELEASE ORAL
Qty: 60 TABLET | Refills: 11 | OUTPATIENT
Start: 2020-09-30

## 2020-11-17 ENCOUNTER — LAB ENCOUNTER (OUTPATIENT)
Dept: LAB | Facility: HOSPITAL | Age: 55
End: 2020-11-17
Attending: INTERNAL MEDICINE
Payer: COMMERCIAL

## 2020-11-17 DIAGNOSIS — C73 FOLLICULAR THYROID CANCER (HCC): Primary | ICD-10-CM

## 2020-11-17 PROCEDURE — 86800 THYROGLOBULIN ANTIBODY: CPT

## 2020-11-17 PROCEDURE — 84439 ASSAY OF FREE THYROXINE: CPT

## 2020-11-17 PROCEDURE — 36415 COLL VENOUS BLD VENIPUNCTURE: CPT

## 2020-11-17 PROCEDURE — 84481 FREE ASSAY (FT-3): CPT

## 2020-11-17 PROCEDURE — 84443 ASSAY THYROID STIM HORMONE: CPT

## 2020-11-17 PROCEDURE — 84432 ASSAY OF THYROGLOBULIN: CPT

## 2020-11-19 ENCOUNTER — TELEPHONE (OUTPATIENT)
Dept: INTERNAL MEDICINE CLINIC | Facility: CLINIC | Age: 55
End: 2020-11-19

## 2020-11-19 DIAGNOSIS — Z13.220 SCREENING FOR LIPID DISORDERS: ICD-10-CM

## 2020-11-19 DIAGNOSIS — Z13.228 SCREENING FOR METABOLIC DISORDER: ICD-10-CM

## 2020-11-19 DIAGNOSIS — Z00.00 ROUTINE GENERAL MEDICAL EXAMINATION AT A HEALTH CARE FACILITY: Primary | ICD-10-CM

## 2020-11-19 DIAGNOSIS — Z13.0 SCREENING FOR DISORDER OF BLOOD AND BLOOD-FORMING ORGANS: ICD-10-CM

## 2020-11-19 NOTE — TELEPHONE ENCOUNTER
Future Appointments   Date Time Provider Cris Ponce   1/12/2021 10:20 AM Donna Sandy MD [facility]     Orders to [place]- Pt informed that labs need to be completed no sooner than 2 weeks prior to the appt.  Pt aware to fast-no call back

## 2020-11-24 ENCOUNTER — HOSPITAL ENCOUNTER (OUTPATIENT)
Dept: MAMMOGRAPHY | Facility: HOSPITAL | Age: 55
Discharge: HOME | End: 2020-11-24
Attending: INTERNAL MEDICINE
Payer: COMMERCIAL

## 2020-11-24 DIAGNOSIS — Z12.31 ENCOUNTER FOR SCREENING MAMMOGRAM FOR MALIGNANT NEOPLASM OF BREAST: ICD-10-CM

## 2020-11-24 PROCEDURE — 77063 BREAST TOMOSYNTHESIS BI: CPT | Performed by: INTERNAL MEDICINE

## 2020-11-24 PROCEDURE — 77067 SCR MAMMO BI INCL CAD: CPT | Performed by: INTERNAL MEDICINE

## 2020-12-09 NOTE — TELEPHONE ENCOUNTER
Patient had labs done with Dr. Rebekah Millard 11/2020  Pending labs that were not done    Future Appointments   Date Time Provider Cris Ponce   12/15/2020  3:00 PM Zena Bowers 25 [address] [facility] [address] [facility] [address]   1/6/2021 10:00 AM REFERENCE [id number]   1/12/2

## 2020-12-15 ENCOUNTER — HOSPITAL ENCOUNTER (OUTPATIENT)
Dept: ULTRASOUND IMAGING | Facility: HOSPITAL | Age: 55
Discharge: HOME | End: 2020-12-15
Attending: INTERNAL MEDICINE
Payer: COMMERCIAL

## 2020-12-15 DIAGNOSIS — C73 FOLLICULAR CANCER OF THYROID (HCC): ICD-10-CM

## 2020-12-15 DIAGNOSIS — E89.0 POSTPROCEDURAL HYPOTHYROIDISM: ICD-10-CM

## 2020-12-15 DIAGNOSIS — C73 FOLLICULAR THYROID CANCER (HCC): ICD-10-CM

## 2020-12-15 PROCEDURE — 76536 US EXAM OF HEAD AND NECK: CPT | Performed by: INTERNAL MEDICINE

## 2020-12-17 NOTE — PROGRESS NOTES
Sent message to radiologist: Can radiology describe the lymph node in more detail and amend the report- does it look like a suspicious or benign lymph node?  Thanks

## 2021-01-08 ENCOUNTER — LAB ENCOUNTER (OUTPATIENT)
Dept: LAB | Age: 56
End: 2021-01-08
Attending: INTERNAL MEDICINE
Payer: COMMERCIAL

## 2021-01-08 DIAGNOSIS — C73 FOLLICULAR THYROID CANCER (HCC): ICD-10-CM

## 2021-01-08 PROCEDURE — 86800 THYROGLOBULIN ANTIBODY: CPT

## 2021-01-08 PROCEDURE — 84432 ASSAY OF THYROGLOBULIN: CPT

## 2021-01-08 PROCEDURE — 36415 COLL VENOUS BLD VENIPUNCTURE: CPT

## 2021-01-10 LAB
THYROGLOBULIN AB: <0.9 IU/ML
THYROGLOBULIN, SERUM OR PLASMA: <0.1 NG/ML

## 2021-01-22 ENCOUNTER — LABORATORY ENCOUNTER (OUTPATIENT)
Dept: LAB | Age: 56
End: 2021-01-22
Attending: INTERNAL MEDICINE
Payer: COMMERCIAL

## 2021-01-22 DIAGNOSIS — Z13.220 SCREENING FOR LIPID DISORDERS: ICD-10-CM

## 2021-01-22 DIAGNOSIS — Z00.00 ROUTINE GENERAL MEDICAL EXAMINATION AT A HEALTH CARE FACILITY: ICD-10-CM

## 2021-01-22 DIAGNOSIS — Z13.0 SCREENING FOR DISORDER OF BLOOD AND BLOOD-FORMING ORGANS: ICD-10-CM

## 2021-01-22 DIAGNOSIS — Z13.228 SCREENING FOR METABOLIC DISORDER: ICD-10-CM

## 2021-01-22 LAB
ALBUMIN SERPL-MCNC: 3.8 G/DL (ref 3.4–5)
ALBUMIN/GLOB SERPL: 1 {RATIO} (ref 1–2)
ALP LIVER SERPL-CCNC: 58 U/L
ALT SERPL-CCNC: 25 U/L
ANION GAP SERPL CALC-SCNC: 3 MMOL/L (ref 0–18)
AST SERPL-CCNC: 15 U/L (ref 15–37)
BASOPHILS # BLD AUTO: 0.06 X10(3) UL (ref 0–0.2)
BASOPHILS NFR BLD AUTO: 1.3 %
BILIRUB SERPL-MCNC: 0.5 MG/DL (ref 0.1–2)
BUN BLD-MCNC: 10 MG/DL (ref 7–18)
BUN/CREAT SERPL: 12 (ref 10–20)
CALCIUM BLD-MCNC: 9 MG/DL (ref 8.5–10.1)
CHLORIDE SERPL-SCNC: 109 MMOL/L (ref 98–112)
CHOLEST SMN-MCNC: 264 MG/DL (ref ?–200)
CO2 SERPL-SCNC: 27 MMOL/L (ref 21–32)
CREAT BLD-MCNC: 0.83 MG/DL
DEPRECATED RDW RBC AUTO: 39.8 FL (ref 35.1–46.3)
EOSINOPHIL # BLD AUTO: 0.2 X10(3) UL (ref 0–0.7)
EOSINOPHIL NFR BLD AUTO: 4.2 %
ERYTHROCYTE [DISTWIDTH] IN BLOOD BY AUTOMATED COUNT: 11.8 % (ref 11–15)
GLOBULIN PLAS-MCNC: 3.9 G/DL (ref 2.8–4.4)
GLUCOSE BLD-MCNC: 95 MG/DL (ref 70–99)
HCT VFR BLD AUTO: 37.3 %
HDLC SERPL-MCNC: 71 MG/DL (ref 40–59)
HGB BLD-MCNC: 12.5 G/DL
IMM GRANULOCYTES # BLD AUTO: 0.01 X10(3) UL (ref 0–1)
IMM GRANULOCYTES NFR BLD: 0.2 %
LDLC SERPL CALC-MCNC: 182 MG/DL (ref ?–100)
LYMPHOCYTES # BLD AUTO: 1.82 X10(3) UL (ref 1–4)
LYMPHOCYTES NFR BLD AUTO: 37.9 %
M PROTEIN MFR SERPL ELPH: 7.7 G/DL (ref 6.4–8.2)
MCH RBC QN AUTO: 30.8 PG (ref 26–34)
MCHC RBC AUTO-ENTMCNC: 33.5 G/DL (ref 31–37)
MCV RBC AUTO: 91.9 FL
MONOCYTES # BLD AUTO: 0.46 X10(3) UL (ref 0.1–1)
MONOCYTES NFR BLD AUTO: 9.6 %
NEUTROPHILS # BLD AUTO: 2.25 X10 (3) UL (ref 1.5–7.7)
NEUTROPHILS # BLD AUTO: 2.25 X10(3) UL (ref 1.5–7.7)
NEUTROPHILS NFR BLD AUTO: 46.8 %
NONHDLC SERPL-MCNC: 193 MG/DL (ref ?–130)
OSMOLALITY SERPL CALC.SUM OF ELEC: 287 MOSM/KG (ref 275–295)
PATIENT FASTING Y/N/NP: YES
PATIENT FASTING Y/N/NP: YES
PLATELET # BLD AUTO: 373 10(3)UL (ref 150–450)
POTASSIUM SERPL-SCNC: 4.3 MMOL/L (ref 3.5–5.1)
RBC # BLD AUTO: 4.06 X10(6)UL
SODIUM SERPL-SCNC: 139 MMOL/L (ref 136–145)
TRIGL SERPL-MCNC: 55 MG/DL (ref 30–149)
VLDLC SERPL CALC-MCNC: 11 MG/DL (ref 0–30)
WBC # BLD AUTO: 4.8 X10(3) UL (ref 4–11)

## 2021-01-22 PROCEDURE — 36415 COLL VENOUS BLD VENIPUNCTURE: CPT

## 2021-01-22 PROCEDURE — 80053 COMPREHEN METABOLIC PANEL: CPT

## 2021-01-22 PROCEDURE — 80061 LIPID PANEL: CPT

## 2021-01-22 PROCEDURE — 85025 COMPLETE CBC W/AUTO DIFF WBC: CPT

## 2021-01-29 ENCOUNTER — OFFICE VISIT (OUTPATIENT)
Dept: INTERNAL MEDICINE CLINIC | Facility: CLINIC | Age: 56
End: 2021-01-29
Payer: COMMERCIAL

## 2021-01-29 VITALS
DIASTOLIC BLOOD PRESSURE: 82 MMHG | HEART RATE: 80 BPM | TEMPERATURE: 97 F | HEIGHT: 68.5 IN | BODY MASS INDEX: 30.86 KG/M2 | RESPIRATION RATE: 16 BRPM | WEIGHT: 206 LBS | SYSTOLIC BLOOD PRESSURE: 133 MMHG

## 2021-01-29 DIAGNOSIS — M16.0 PRIMARY OSTEOARTHRITIS OF BOTH HIPS: ICD-10-CM

## 2021-01-29 DIAGNOSIS — M25.552 LEFT HIP PAIN: ICD-10-CM

## 2021-01-29 DIAGNOSIS — Z23 NEED FOR SHINGLES VACCINE: ICD-10-CM

## 2021-01-29 DIAGNOSIS — Z00.00 ROUTINE GENERAL MEDICAL EXAMINATION AT A HEALTH CARE FACILITY: Primary | ICD-10-CM

## 2021-01-29 DIAGNOSIS — Z23 FLU VACCINE NEED: ICD-10-CM

## 2021-01-29 DIAGNOSIS — E78.49 OTHER HYPERLIPIDEMIA: ICD-10-CM

## 2021-01-29 PROCEDURE — 90472 IMMUNIZATION ADMIN EACH ADD: CPT | Performed by: INTERNAL MEDICINE

## 2021-01-29 PROCEDURE — 99396 PREV VISIT EST AGE 40-64: CPT | Performed by: INTERNAL MEDICINE

## 2021-01-29 PROCEDURE — 90686 IIV4 VACC NO PRSV 0.5 ML IM: CPT | Performed by: INTERNAL MEDICINE

## 2021-01-29 PROCEDURE — 3075F SYST BP GE 130 - 139MM HG: CPT | Performed by: INTERNAL MEDICINE

## 2021-01-29 PROCEDURE — 90471 IMMUNIZATION ADMIN: CPT | Performed by: INTERNAL MEDICINE

## 2021-01-29 PROCEDURE — 3079F DIAST BP 80-89 MM HG: CPT | Performed by: INTERNAL MEDICINE

## 2021-01-29 PROCEDURE — 3008F BODY MASS INDEX DOCD: CPT | Performed by: INTERNAL MEDICINE

## 2021-01-29 PROCEDURE — 90750 HZV VACC RECOMBINANT IM: CPT | Performed by: INTERNAL MEDICINE

## 2021-01-29 RX ORDER — DICLOFENAC SODIUM 75 MG/1
75 TABLET, DELAYED RELEASE ORAL 2 TIMES DAILY
Qty: 60 TABLET | Refills: 5 | Status: SHIPPED | OUTPATIENT
Start: 2021-01-29

## 2021-01-29 NOTE — PROGRESS NOTES
Patient presents with:  Physical: SN Rm 4  Vaccinations: Requesting Flu      HPI:    Patient here for CPE  utd on pap and mammogram  C/o b/l hip pain for years due to OA,  Takes diclofenac prn, needs refill.  Symptoms worsening so would like to go back to h impairment      Past Surgical History:   Procedure Laterality Date   • COLONOSCOPY  April or May 2016   • DENTAL SURGERY PROCEDURE      wisdom teeth x4 removal   • THYROIDECTOMY      partial    • THYROIDECTOMY Right 3/10/2016    Performed by Justina Lara ([id number])                          10/22/2014      FLULAVAL 6 months & older 0.5 ml Prefilled syringe (97442)                          11/27/2017  01/10/2019  01/29/2021      FLUZONE 6 months and older PFS 0.5 ml (98476)                          11/21/2019 of both hips- she will f/u with her ortho Dr. Lashonda Lima This Encounter      Lipid Panel [E]      Flulaval 6 months and older 0.5 ml PFS [04329]      Zoster Recombinant Adjuvanted [Shingrix -Shingles] (24026)      Meds & Refills for this Vi

## 2021-03-08 ENCOUNTER — TELEPHONE (OUTPATIENT)
Dept: INTERNAL MEDICINE CLINIC | Facility: CLINIC | Age: 56
End: 2021-03-08

## 2021-03-08 DIAGNOSIS — Z23 NEED FOR SHINGLES VACCINE: Primary | ICD-10-CM

## 2021-03-08 NOTE — TELEPHONE ENCOUNTER
2nd shingles shot scheduled for   Future Appointments   Date Time Provider Cris Ponce   4/30/2021 11:00 AM [facility] NURSE [facility]

## 2021-03-25 ENCOUNTER — IMMUNIZATION (OUTPATIENT)
Dept: LAB | Age: 56
End: 2021-03-25
Attending: HOSPITALIST
Payer: COMMERCIAL

## 2021-03-25 DIAGNOSIS — Z23 NEED FOR VACCINATION: Primary | ICD-10-CM

## 2021-03-25 PROCEDURE — 0001A SARSCOV2 VAC 30MCG/0.3ML IM: CPT

## 2021-04-15 ENCOUNTER — IMMUNIZATION (OUTPATIENT)
Dept: LAB | Age: 56
End: 2021-04-15
Attending: HOSPITALIST
Payer: COMMERCIAL

## 2021-04-15 DIAGNOSIS — Z23 NEED FOR VACCINATION: Primary | ICD-10-CM

## 2021-04-15 PROCEDURE — 0002A SARSCOV2 VAC 30MCG/0.3ML IM: CPT

## 2021-04-28 ENCOUNTER — TELEPHONE (OUTPATIENT)
Dept: INTERNAL MEDICINE CLINIC | Facility: CLINIC | Age: 56
End: 2021-04-28

## 2021-04-28 NOTE — TELEPHONE ENCOUNTER
Pt had her covid vaccine on 4/15 and is scheduled this Friday 4/30 for her 2nd shingles shot. Pt wants to know if the timing of them is ok and if she can get her 2nd shingles shot this Friday, please advise.

## 2021-04-30 ENCOUNTER — NURSE ONLY (OUTPATIENT)
Dept: INTERNAL MEDICINE CLINIC | Facility: CLINIC | Age: 56
End: 2021-04-30
Payer: COMMERCIAL

## 2021-04-30 PROCEDURE — 90750 HZV VACC RECOMBINANT IM: CPT | Performed by: INTERNAL MEDICINE

## 2021-04-30 PROCEDURE — 90471 IMMUNIZATION ADMIN: CPT | Performed by: INTERNAL MEDICINE

## 2021-11-16 ENCOUNTER — LAB ENCOUNTER (OUTPATIENT)
Dept: LAB | Age: 56
End: 2021-11-16
Attending: INTERNAL MEDICINE
Payer: COMMERCIAL

## 2021-11-16 DIAGNOSIS — E78.49 OTHER HYPERLIPIDEMIA: ICD-10-CM

## 2021-11-16 PROCEDURE — 80061 LIPID PANEL: CPT | Performed by: INTERNAL MEDICINE

## 2021-11-17 ENCOUNTER — LABORATORY ENCOUNTER (OUTPATIENT)
Dept: LAB | Age: 56
End: 2021-11-17
Attending: INTERNAL MEDICINE
Payer: COMMERCIAL

## 2021-11-17 DIAGNOSIS — C73 FOLLICULAR THYROID CANCER (HCC): ICD-10-CM

## 2021-11-17 DIAGNOSIS — C73 MALIGNANT NEOPLASM OF THYROID GLAND (HCC): Primary | ICD-10-CM

## 2021-11-17 PROCEDURE — 84439 ASSAY OF FREE THYROXINE: CPT

## 2021-11-17 PROCEDURE — 86800 THYROGLOBULIN ANTIBODY: CPT

## 2021-11-17 PROCEDURE — 84481 FREE ASSAY (FT-3): CPT

## 2021-11-17 PROCEDURE — 84443 ASSAY THYROID STIM HORMONE: CPT

## 2021-11-17 PROCEDURE — 36415 COLL VENOUS BLD VENIPUNCTURE: CPT

## 2021-11-17 PROCEDURE — 84432 ASSAY OF THYROGLOBULIN: CPT

## 2022-11-17 ENCOUNTER — LAB ENCOUNTER (OUTPATIENT)
Dept: LAB | Age: 57
End: 2022-11-17
Attending: INTERNAL MEDICINE
Payer: COMMERCIAL

## 2022-11-17 DIAGNOSIS — C73 FOLLICULAR THYROID CANCER (HCC): Primary | ICD-10-CM

## 2022-11-17 LAB
T4 FREE SERPL-MCNC: 1.7 NG/DL (ref 0.8–1.7)
TSI SER-ACNC: 0.06 MIU/ML (ref 0.36–3.74)

## 2022-11-17 PROCEDURE — 84443 ASSAY THYROID STIM HORMONE: CPT

## 2022-11-17 PROCEDURE — 84432 ASSAY OF THYROGLOBULIN: CPT

## 2022-11-17 PROCEDURE — 84439 ASSAY OF FREE THYROXINE: CPT

## 2022-11-17 PROCEDURE — 36415 COLL VENOUS BLD VENIPUNCTURE: CPT

## 2022-11-17 PROCEDURE — 86800 THYROGLOBULIN ANTIBODY: CPT

## 2022-11-18 LAB
THYROGLOBULIN AB: <0.9 IU/ML
THYROGLOBULIN, SERUM OR PLASMA: <0.1 NG/ML

## 2025-05-19 NOTE — MR AVS SNAPSHOT
[address], [address] [place] [address] [phone]               Thank you for choosing us for your health care visit with Pablo Mccray MD.  We are glad to serve you and happy to provide you with this Scheduled procedure with Patient at    Telephone Information:   Mobile [phone]      Scheduled Via: Case Request Order for  [facility]  Procedure date: 6/18/25  Procedure time: 12:00 PM  Insurance confirmed as [facility] [employer], will be the same at time of procedure?: YES  Insurance Accepted at Facility? YES    The following have been confirmed:    DIABETIC: NO  DIABETIC MEDICATION FOR WEIGHT LOSS:  NO  DO NOT TAKE ASPIRIN OR PHENTERMINE 7 days before your surgery.  If daily aspirin was prescribed by MD, verify if/when you should stop prior.    Pre-Op testing required No, Patient informed Yes  Prep required? YES HIBICLENS ; Briefly reviewed? YES       STAAR Kit Items:  Patient to  STAAR kit, NO ENSURE, pre-operative wash, new toothbrush and tooth paste, at pre-op appointment or in clinic.     Surgery confirmation letter with instructions sent to patient.    thereby distorting the mammogram.  Wear a two piece outfit the day of the exam. This allows you to be more comfortable during the exam.  There are no eating or activity restrictions for this exam.            Feb 17, 2017  7:00 AM   US ABDOMEN with Zena Bowers 25 patients - DO NOT TAKE YOUR INSULIN OR ORAL MEDS. Please bring to your insulin or oral medication to your exam. Please refer back to your primary care physician for adjustment of diabetic medications.             Mar 10, 2017  9:30 AM   Follow up with Karl Galdamez [facility]oc Dx:  Encounter for mammogram to establish baseline mammogram [Z12.31]           CARD NUC STRESS TEST LEXISCAN    Complete by:  Feb 10, 2017 (Approximate)    Assoc Dx:  Hypercholesteremia [E78.00], Abnormal EKG [R94.31]           LIPID PANEL    Compl

## (undated) NOTE — MR AVS SNAPSHOT
[address], [address] [place] [id number] [phone]               Thank you for choosing us for your health care visit with [facility].   We are glad to serve you and happy to provide you with this summary omeprazole 20 MG Cpdr   Take 1 capsule (20 mg total) by mouth every morning before breakfast.   Commonly known as:  PRILOSEC                   Today's Orders     NMR Lipoprofile Test    Complete by:   Feb 01, 2017 (Approximate)    Assoc Dx:  Routine genera including white bread, rice and pasta   Eat plenty of protein, keep the fat content low Sugars:  sodas and sports drinks, candies and desserts   Eat plenty of low-fat dairy products High fat meats and dairy   Choose whole grain products Foods high in sodiu

## (undated) NOTE — ED AVS SNAPSHOT
[facility] Emergency Department    [facility]  [address] [address]    Phone:  [phone]    Fax:  Inocente Soriano   MRN: [id number]    Department:  [facility] Emergency Department   Date of Visit: - HYDROcodone-acetaminophen 5-325 MG Tabs              Discharge Instructions       Follow-up for further evaluation with orthopedics, call for an appointment. Ice, elevate continue diclofenac. Activity as tolerated. Arm sling only if needed.   Norco as [facility] Emergency Department. Follow-up care is at the discretion of that Physician. IF THERE IS ANY CHANGE OR WORSENING OF YOUR CONDITION, CALL YOUR PRIMARY CARE PHYSICIAN AT ONCE OR RETURN IMMEDIATELY TO THE EMERGENCY DEPARTMENT.     If you hav [phone]. - If you don’t have insurance, Myriam Cruz has partnered with Patient [address] to help you get signed up for insurance coverage.   Patient Coreen Souza is a Federal Navigator program that can help with your Affordab Call [phone] for help. [employer] is NOT to be used for urgent needs. For medical emergencies, dial 911.

## (undated) NOTE — MR AVS SNAPSHOT
[facility] [address] [place] [id number] [phone]               Thank you for choosing us for your health care visit with Vinicius Johnson MD.  We are glad to serve you and happy to provide you with this summar 114/68 mmHg 71 98.2 °F (36.8 °C) (Oral) 68\" 183 lb 27.83 kg/m2         Current Medications          This list is accurate as of: 6/23/17 11:47 AM.  Always use your most recent med list.                Diclofenac Sodium 75 MG Tbec   Take 1 tablet (75 mg t

## (undated) NOTE — LETTER
01/08/21        Sandrine Vega  [email or web address] [phone]      Dear [facility],    [address] [place] [address] records indicate that you have outstanding lab work and or testing that was ordered for you and has not yet been completed:  Orders Placed This Encou

## (undated) NOTE — MR AVS SNAPSHOT
[facility] [address], [address] [address] [phone]               Thank you for choosing us for your health care visit with Kaela Ricci MD.  We are glad to serve you and happy to provide you with this Levothyroxine Sodium 150 MCG Tabs   Take 1 tablet (150 mcg total) by mouth before breakfast. .   What changed:  additional instructions   Commonly known as:  SYNTHROID           loratadine 10 MG Tabs   Take 10 mg by mouth daily as needed for Allergies.

## (undated) NOTE — MR AVS SNAPSHOT
[facility] [address], [address] [address] [phone]               Thank you for choosing us for your health care visit with Isidro Lechuga MD.  We are glad to serve you and happy to provide you with this (M16.0) Primary osteoarthritis of both hips, uncontrolled  Plan:  Trylenol for moderate pain            Diclofenac for severe pain prn            F/U Dr. Fuentes Boards prn    (E66.3) Overweight, uncontrolled but improving  Plan: Continue dieting about 1200 robbin Summaries. If you've been to the Emergency Department or your doctor's office, you can view your past visit information in [employer] by going to Visits < Visit Summaries. [employer] questions? Call [phone] for help.   [employer] is NOT to be used for urge

## (undated) NOTE — MR AVS SNAPSHOT
[address], [address] [place] [address] [phone]               Thank you for choosing us for your health care visit with Ryley Rehman MD.  We are glad to serve you and happy to provide you with this thereby distorting the mammogram.  Wear a two piece outfit the day of the exam. This allows you to be more comfortable during the exam.  There are no eating or activity restrictions for this exam.            Feb 17, 2017  7:00 AM   US ABDOMEN with Zena Bowers 25 patients - DO NOT TAKE YOUR INSULIN OR ORAL MEDS. Please bring to your insulin or oral medication to your exam. Please refer back to your primary care physician for adjustment of diabetic medications.             Mar 10, 2017  9:30 AM   Follow up with Mary Streeter [facility]oc Dx:  Encounter for mammogram to establish baseline mammogram [Z12.31]           CARD NUC STRESS TEST LEXISCAN    Complete by:  Feb 10, 2017 (Approximate)    Assoc Dx:  Hypercholesteremia [E78.00], Abnormal EKG [R94.31]           LIPID PANEL    Compl

## (undated) NOTE — ED AVS SNAPSHOT
[facility] Emergency Department    [facility]  [address] [address]    Phone:  [phone]    Fax:  Inocente Hernandez   MRN: [id number]    Department:  [facility] Emergency Department   Date of Visit: IF THERE IS ANY CHANGE OR WORSENING OF YOUR CONDITION, CALL YOUR PRIMARY CARE PHYSICIAN AT ONCE OR RETURN IMMEDIATELY TO THE EMERGENCY DEPARTMENT.     If you have been prescribed any medication(s), please fill your prescription right away and begin taking t

## (undated) NOTE — LETTER
08/04/21        Jesus Newberry  [address] [phone]      Dear Alan Li,    [facility] records indicate that you have outstanding lab work and or testing that was ordered for you and has not yet been completed:  Orders Placed This Encou